# Patient Record
Sex: FEMALE | Race: WHITE | URBAN - NONMETROPOLITAN AREA
[De-identification: names, ages, dates, MRNs, and addresses within clinical notes are randomized per-mention and may not be internally consistent; named-entity substitution may affect disease eponyms.]

---

## 2023-01-26 ENCOUNTER — WEB ENCOUNTER (OUTPATIENT)
Dept: URBAN - NONMETROPOLITAN AREA CLINIC 2 | Facility: CLINIC | Age: 33
End: 2023-01-26

## 2023-01-26 ENCOUNTER — OFFICE VISIT (OUTPATIENT)
Dept: URBAN - NONMETROPOLITAN AREA CLINIC 2 | Facility: CLINIC | Age: 33
End: 2023-01-26
Payer: COMMERCIAL

## 2023-01-26 ENCOUNTER — LAB OUTSIDE AN ENCOUNTER (OUTPATIENT)
Dept: URBAN - NONMETROPOLITAN AREA CLINIC 2 | Facility: CLINIC | Age: 33
End: 2023-01-26

## 2023-01-26 VITALS
WEIGHT: 143 LBS | SYSTOLIC BLOOD PRESSURE: 133 MMHG | DIASTOLIC BLOOD PRESSURE: 89 MMHG | BODY MASS INDEX: 26.31 KG/M2 | HEART RATE: 70 BPM | HEIGHT: 62 IN

## 2023-01-26 DIAGNOSIS — Z83.79: ICD-10-CM

## 2023-01-26 DIAGNOSIS — D64.89 ANEMIA DUE TO OTHER CAUSE: ICD-10-CM

## 2023-01-26 DIAGNOSIS — K62.5 RECTAL BLEEDING: ICD-10-CM

## 2023-01-26 DIAGNOSIS — R19.7 DIARRHEA, UNSPECIFIED TYPE: ICD-10-CM

## 2023-01-26 DIAGNOSIS — F41.9 ANXIETY: ICD-10-CM

## 2023-01-26 DIAGNOSIS — K64.4 HEMORRHOIDAL SKIN TAGS: ICD-10-CM

## 2023-01-26 PROBLEM — 48694002: Status: ACTIVE | Noted: 2023-01-26

## 2023-01-26 PROCEDURE — 99244 OFF/OP CNSLTJ NEW/EST MOD 40: CPT | Performed by: INTERNAL MEDICINE

## 2023-01-26 PROCEDURE — 99204 OFFICE O/P NEW MOD 45 MIN: CPT | Performed by: INTERNAL MEDICINE

## 2023-01-26 RX ORDER — SODIUM PICOSULFATE, MAGNESIUM OXIDE, AND ANHYDROUS CITRIC ACID 10; 3.5; 12 MG/160ML; G/160ML; G/160ML
160 ML LIQUID ORAL
Qty: 1 PACK | Refills: 0 | OUTPATIENT
Start: 2023-01-26 | End: 2023-01-27

## 2023-01-26 NOTE — PHYSICAL EXAM GASTROINTESTINAL
Abdomen , soft, nontender, nondistended , no guarding or rigidity , no masses palpable , normal bowel sounds , Liver and Spleen , no hepatomegaly present , no hepatosplenomegaly , liver nontender , spleen not palpable , Rectal ,normal sphincter tone , no internal hemorrhoids, rectal masses or bleeding present + External hemorrhoidal skin tags

## 2023-01-26 NOTE — HPI-TODAY'S VISIT:
1/26/2023 Ms. Licha Steward was referred to our office for evaluation rectal bleeding by MARC Solano. A copy of this note and recommendations will be sent to the referring provider's office.  She endorses a long history of noticing blood in her stool since the age of 17 years old.  Previous to moving to [place] she lived in [place] with a rustic lifestyle, voiding in a bucket outside and was not able to visualize the output.  Now she has noticed this is happening more frequently, weekly but she finds it hard to quantify.  She states she can feel her bowel movements through her rectal vault especially when it loose and feels pain  through entire body ending with clammy hands.  She does endorse an element of anxiety where she tends to clench her abdominal wall and rectum. States she was told months ago she was anemic, but no further information or repeat labwork. She has tried to slightly improve her diet by adding rice and eating less vegetables which she has noticed has helped with firming up her output.  She denies a smoking history and has occasional alcohol intake limited to 2 units a few days a week denies current drug use.  Has a brother who is reported to her that he has intractable watery diarrhea with GI work-up in [place].  She states awareness of the presence of external hemorrhoids but is not able to describe them, states they are not painful. SP

## 2023-03-02 PROBLEM — 160381001: Status: ACTIVE | Noted: 2023-01-26

## 2023-03-08 ENCOUNTER — OFFICE VISIT (OUTPATIENT)
Dept: URBAN - NONMETROPOLITAN AREA CLINIC 2 | Facility: CLINIC | Age: 33
End: 2023-03-08

## 2023-04-12 ENCOUNTER — OFFICE VISIT (OUTPATIENT)
Dept: URBAN - NONMETROPOLITAN AREA SURGERY CENTER 1 | Facility: SURGERY CENTER | Age: 33
End: 2023-04-12
Payer: COMMERCIAL

## 2023-04-12 DIAGNOSIS — K62.5 ANAL BLEEDING: ICD-10-CM

## 2023-04-12 DIAGNOSIS — R19.7 ACUTE DIARRHEA: ICD-10-CM

## 2023-04-12 PROCEDURE — 45380 COLONOSCOPY AND BIOPSY: CPT | Performed by: INTERNAL MEDICINE

## 2023-04-12 PROCEDURE — G8907 PT DOC NO EVENTS ON DISCHARG: HCPCS | Performed by: INTERNAL MEDICINE

## 2023-04-13 ENCOUNTER — OFFICE VISIT (OUTPATIENT)
Dept: URBAN - NONMETROPOLITAN AREA CLINIC 2 | Facility: CLINIC | Age: 33
End: 2023-04-13

## 2023-06-30 ENCOUNTER — OFFICE VISIT (OUTPATIENT)
Dept: URBAN - NONMETROPOLITAN AREA CLINIC 2 | Facility: CLINIC | Age: 33
End: 2023-06-30

## 2023-07-20 ENCOUNTER — OFFICE VISIT (OUTPATIENT)
Dept: URBAN - NONMETROPOLITAN AREA CLINIC 2 | Facility: CLINIC | Age: 33
End: 2023-07-20
Payer: COMMERCIAL

## 2023-07-20 ENCOUNTER — DASHBOARD ENCOUNTERS (OUTPATIENT)
Age: 33
End: 2023-07-20

## 2023-07-20 VITALS
SYSTOLIC BLOOD PRESSURE: 129 MMHG | BODY MASS INDEX: 27.1 KG/M2 | WEIGHT: 147.3 LBS | HEIGHT: 62 IN | DIASTOLIC BLOOD PRESSURE: 84 MMHG | HEART RATE: 65 BPM

## 2023-07-20 DIAGNOSIS — R19.7 ACUTE DIARRHEA: ICD-10-CM

## 2023-07-20 DIAGNOSIS — K62.5 RECTAL BLEEDING: ICD-10-CM

## 2023-07-20 DIAGNOSIS — R19.4 CHANGE IN BOWEL HABITS: ICD-10-CM

## 2023-07-20 PROBLEM — 31704005: Status: ACTIVE | Noted: 2023-01-26

## 2023-07-20 PROBLEM — 12063002: Status: ACTIVE | Noted: 2023-01-26

## 2023-07-20 PROBLEM — 62315008: Status: ACTIVE | Noted: 2023-01-26

## 2023-07-20 PROBLEM — 271737000: Status: ACTIVE | Noted: 2023-01-26

## 2023-07-20 PROBLEM — 88111009: Status: ACTIVE | Noted: 2023-01-26

## 2023-07-20 PROCEDURE — 99212 OFFICE O/P EST SF 10 MIN: CPT | Performed by: INTERNAL MEDICINE

## 2023-07-20 NOTE — HPI-TODAY'S VISIT:
1/26/2023 Ms. Licha Steward was referred to our office for evaluation rectal bleeding by MARC Solano. A copy of this note and recommendations will be sent to the referring provider's office.  She endorses a long history of noticing blood in her stool since the age of 17 years old.  Previous to moving to [place] she lived in [place] with a rustic lifestyle, voiding in a bucket outside and was not able to visualize the output.  Now she has noticed this is happening more frequently, weekly but she finds it hard to quantify.  She states she can feel her bowel movements through her rectal vault especially when it loose and feels pain  through entire body ending with clammy hands.  She does endorse an element of anxiety where she tends to clench her abdominal wall and rectum. States she was told months ago she was anemic, but no further information or repeat labwork. She has tried to slightly improve her diet by adding rice and eating less vegetables which she has noticed has helped with firming up her output.  She denies a smoking history and has occasional alcohol intake limited to 2 units a few days a week denies current drug use.  Has a brother who is reported to her that he has intractable watery diarrhea with GI work-up in [place].  She states awareness of the presence of external hemorrhoids but is not able to describe them, states they are not painful. SP  7/20/2023 Licha returns to clinic for follow-up after colonoscopy with Dr. Guzman 4/12/2023 for rectal bleeding she was found to have nonbleeding internal hemorrhoids that were small and grade 1 the entire examined portion of the colon appeared normal and biopsies were taken for histology.  Pathology results showed colonic mucosa with no significant histopathology negative for active chronic or microscopic colitis. Stool continued fluffy, now firmer after she started taking a woman's supplement from [employer] Denies Cramping or incontinence, usually only 1 BM per day with days where she has some freq small BMs. Under high stress now lost her job and seeking new employment.  She discussed with her mom and her mother has history of bleeding hemorrhoids having to wear briefs at times on trips. Patient continues with varied diet eats ivan and saurkraut Seeing rectal bleeding on and off every other week She has no yet tried suppositories . We reviewed all hemorrhoid management , she declines referral to discuss banding and she will follow up as needed. SP